# Patient Record
Sex: FEMALE | Race: WHITE | NOT HISPANIC OR LATINO | Employment: STUDENT | URBAN - METROPOLITAN AREA
[De-identification: names, ages, dates, MRNs, and addresses within clinical notes are randomized per-mention and may not be internally consistent; named-entity substitution may affect disease eponyms.]

---
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
## 2022-01-12 ENCOUNTER — OFFICE VISIT (OUTPATIENT)
Dept: BEHAVIORAL HEALTH | Facility: CLINIC | Age: 22
End: 2022-01-12

## 2022-01-12 VITALS
DIASTOLIC BLOOD PRESSURE: 70 MMHG | SYSTOLIC BLOOD PRESSURE: 112 MMHG | WEIGHT: 142 LBS | BODY MASS INDEX: 24.24 KG/M2 | HEIGHT: 64 IN

## 2022-01-12 DIAGNOSIS — F32.9 REACTIVE DEPRESSION: ICD-10-CM

## 2022-01-12 DIAGNOSIS — F41.1 GAD (GENERALIZED ANXIETY DISORDER): ICD-10-CM

## 2022-01-12 DIAGNOSIS — F43.21 GRIEF: Primary | ICD-10-CM

## 2022-01-12 DIAGNOSIS — F90.0 ATTENTION DEFICIT HYPERACTIVITY DISORDER (ADHD), PREDOMINANTLY INATTENTIVE TYPE: ICD-10-CM

## 2022-01-12 PROCEDURE — 90792 PSYCH DIAG EVAL W/MED SRVCS: CPT | Performed by: NURSE PRACTITIONER

## 2022-01-12 RX ORDER — DESVENLAFAXINE SUCCINATE 50 MG/1
50 TABLET, EXTENDED RELEASE ORAL DAILY
COMMUNITY
Start: 2021-12-01 | End: 2022-01-12 | Stop reason: SDUPTHER

## 2022-01-12 RX ORDER — DESVENLAFAXINE SUCCINATE 50 MG/1
50 TABLET, EXTENDED RELEASE ORAL DAILY
Qty: 30 TABLET | Refills: 3 | Status: SHIPPED | OUTPATIENT
Start: 2022-01-12 | End: 2022-03-09

## 2022-01-12 NOTE — PROGRESS NOTES
Patient Name: Maru Caraballo  MRN: [id number]   :  2000     Referring Physician: Jonh Carbajal MD    Chief Complaint:     ICD-10-CM ICD-9-CM   1. Grief  F43.21 309.0   2. Reactive depression  F32.9 300.4   3. RAMSEY (generalized anxiety disorder)  F41.1 300.02   4. Attention deficit hyperactivity disorder (ADHD), predominantly inattentive type  F90.0 314.00       HPI:   Maru Caraballo is a 21 y.o. female who is here today for initial evaluation of ADHD, Anxiety , Depression and Grief. Pt states her parents think pts anxiety is through the roof. Worries a lot about death. Wears a pulse ox most of the day. Figits. Counts a lot. Feels pristiq is starting to help. Focus is terrible. Is in class trying to listen but is thinking about other things and drifts off. When she is at school she almost never studies. When she is at home her parents make her study so she is able to get more done at home. In school for business. In 2019 watched her aunt die in the hospital. She was the only one there.     Past Medical History:   History reviewed. No pertinent past medical history.    Past Surgical History:   Past Surgical History:   Procedure Laterality Date   • CHOLECYSTECTOMY     • WISDOM TOOTH EXTRACTION         Social History:   Social History     Socioeconomic History   • Marital status: Single   Tobacco Use   • Smoking status: Never Smoker   • Smokeless tobacco: Never Used   Vaping Use   • Vaping Use: Never used   Substance and Sexual Activity   • Alcohol use: Never   • Sexual activity: Never       Family History:  Family History   Problem Relation Age of Onset   • Diabetes Paternal Grandfather    • Lymphoma Paternal Grandmother    • Leukemia Maternal Grandmother    • Diabetes Maternal Grandmother    • Lupus Maternal Aunt    • Thyroid cancer Maternal Aunt        Allergy:  Allergies   Allergen Reactions   • Latex Rash       Current Medications:   Current Outpatient Medications   Medication Sig Dispense Refill    • desvenlafaxine (PRISTIQ) 50 MG 24 hr tablet Take 1 tablet by mouth Daily. 30 tablet 3   • norethindrone-ethinyl estradiol FE (Junel FE 1/20) 1-20 MG-MCG per tablet Take 1 tablet by mouth Daily. 28 tablet 12   • lisdexamfetamine (Vyvanse) 20 MG capsule Take 1 capsule by mouth Every Morning 30 capsule 0     No current facility-administered medications for this visit.       Lab Results:   Office Visit on 01/12/2022   Component Date Value Ref Range Status   • Report Summary 01/12/2022 FINAL   Final    Comment: ====================================================================  [facility] DRUG ANALYSIS,[facility]  ====================================================================  Test                             Result       Flag       Units    NO DRUGS DETECTED.  ====================================================================  Test                      Result    Flag   Units      Ref Range    Creatinine              141              mg/dL      >=20  ====================================================================  Declared Medications:   Medication list was not provided.  ====================================================================  For clinical consultation, please call [phone].  ====================================================================         Review of Symptoms:   Review of Systems   Constitutional: Negative for activity change, appetite change, fatigue, unexpected weight gain and unexpected weight loss.   Respiratory: Negative for shortness of breath and wheezing.    Gastrointestinal: Negative for constipation, diarrhea, nausea and vomiting.   Musculoskeletal: Negative for gait problem.   Skin: Negative for dry skin and rash.   Neurological: Negative for dizziness, speech difficulty, weakness, light-headedness, headache, memory problem and confusion.   Psychiatric/Behavioral: Positive for decreased concentration, depressed mood and stress. Negative for agitation, behavioral  "problems, dysphoric mood, hallucinations, self-injury, sleep disturbance, suicidal ideas and negative for hyperactivity. The patient is nervous/anxious.        Physical Exam:   Physical Exam  Vitals and nursing note reviewed.   Constitutional:       General: She is not in acute distress.     Appearance: She is well-developed. She is not diaphoretic.   HENT:      Head: Normocephalic and atraumatic.   Eyes:      Conjunctiva/sclera: Conjunctivae normal.   Cardiovascular:      Rate and Rhythm: Normal rate.   Pulmonary:      Effort: Pulmonary effort is normal. No respiratory distress.   Musculoskeletal:         General: Normal range of motion.      Cervical back: Full passive range of motion without pain and normal range of motion.   Skin:     General: Skin is warm and dry.   Neurological:      Mental Status: She is alert and oriented to person, place, and time.   Psychiatric:         Mood and Affect: Mood is anxious and depressed. Affect is not labile, blunt, angry or inappropriate.         Speech: Speech is not rapid and pressured or tangential.         Behavior: Behavior normal. Behavior is not agitated, slowed, aggressive, withdrawn, hyperactive or combative. Behavior is cooperative.         Thought Content: Thought content normal. Thought content is not paranoid or delusional. Thought content does not include homicidal or suicidal ideation. Thought content does not include homicidal or suicidal plan.         Judgment: Judgment normal.       Blood pressure 112/70, height 162.6 cm (64\"), weight 64.4 kg (142 lb).  Body mass index is 24.37 kg/m².     Mental Status Exam:   Appearance: appropriate  Hygiene:   good  Cooperation:  Cooperative  Eye Contact:  Good  Psychomotor Behavior:  Appropriate  Mood:anxious and depressed  Affect:  Appropriate  Hopelessness: Denies  Speech:  Normal  Thought Process:  Goal directed  Thought Content:  Normal  Suicidal:  None  Homicidal:  None  Hallucinations:  None  Delusion:  " None  Memory:  Intact  Orientation:  Person, Place, Time and Situation  Reliability:  good  Insight:  Good  Judgement:  Good  Impulse Control:  Good  Physical/Medical Issues:  No     PHQ-9 Depression Screening  Little interest or pleasure in doing things? 3   Feeling down, depressed, or hopeless? 1   Trouble falling or staying asleep, or sleeping too much? 3 (Trouble with all off and on)   Feeling tired or having little energy? 3   Poor appetite or overeating? 3 (Poor appetite)   Feeling bad about yourself - or that you are a failure or have let yourself or your family down? 1   Trouble concentrating on things, such as reading the newspaper or watching television? 3   Moving or speaking so slowly that other people could have noticed? Or the opposite - being so fidgety or restless that you have been moving around a lot more than usual? 0   Thoughts that you would be better off dead, or of hurting yourself in some way? 0   PHQ-9 Total Score 17   If you checked off any problems, how difficult have these problems made it for you to do your work, take care of things at home, or get along with other people? Extremely dIfficult      Assessment/Plan:   Diagnoses and all orders for this visit:    1. Grief (Primary)  -     Ambulatory Referral to Behavioral Health  -     Compliance Drug Analysis, Ur - Urine, Clean Catch    2. Reactive depression  -     Ambulatory Referral to Behavioral [facility]  -     desvenlafaxine (PRISTIQ) 50 MG 24 hr tablet; Take 1 tablet by mouth Daily.  Dispense: 30 tablet; Refill: 3  -     Compliance Drug Analysis, Ur - Urine, Clean Catch    3. RAMSEY (generalized anxiety disorder)  -     Ambulatory Referral to Behavioral Health  -     desvenlafaxine (PRISTIQ) 50 MG 24 hr tablet; Take 1 tablet by mouth Daily.  Dispense: 30 tablet; Refill: 3  -     Compliance Drug Analysis, Ur - Urine, Clean Catch    4. Attention deficit hyperactivity disorder (ADHD), predominantly inattentive type  -     Ambulatory Referral  to Behavioral Health  -     lisdexamfetamine (Vyvanse) 20 MG capsule; Take 1 capsule by mouth Every Morning  Dispense: 30 capsule; Refill: 0  -     Compliance Drug Analysis, Ur - Urine, Clean Catch    We will refer for therapy. Continue pristiq as pt states her ocd type symptoms are better, Will start Vyvanse 20 mg. UDS and CSA obtained.    A psychological evaluation was conducted in order to assess past and current level of functioning. Areas assessed included, but were not limited to: perception of social support, perception of ability to face and deal with challenges in life (positive functioning), anxiety symptoms, depressive symptoms, perspective on beliefs/belief system, coping skills for stress, intelligence level,  Therapeutic rapport was established. Interventions conducted today were geared towards incorporating medication management along with support for continued therapy. Education was also provided as to the med management with this provider and what to expect in subsequent sessions.    We discussed risks, benefits,goals and side effects of the above medication and the patient was agreeable with the plan.Patient was educated on the importance of compliance with treatment and follow-up appointments. Patient is aware to contact the [facility] with any worsening of symptoms. To call for questions or concerns and return early if necessary. Patent is agreeable to go to the Emergency Department or call 911 should they begin SI/HI.     Treatment Plan:   Discussed risks, benefits, and alternatives of medication. Encouraged healthy habits (eating, exercise and sleep). Call if any questions or problems arise. Medication reconciled. Controlled substance monitoring report reviewed. Provided psychoeducation.. Discussed coping strategies and current stressors. Set appropriate boundaries and limits for patient's well-being. Use distraction techniques to improve symptoms. Access support networks.      Return in  about 4 weeks (around 2/9/2022) for Follow Up 30 min.    Lisette Wyatt, APRN

## 2022-01-18 LAB — DRUGS UR: NORMAL

## 2022-02-08 ENCOUNTER — OFFICE VISIT (OUTPATIENT)
Dept: OBSTETRICS AND GYNECOLOGY | Facility: CLINIC | Age: 22
End: 2022-02-08

## 2022-02-08 VITALS
WEIGHT: 136 LBS | DIASTOLIC BLOOD PRESSURE: 80 MMHG | HEIGHT: 65 IN | BODY MASS INDEX: 22.66 KG/M2 | SYSTOLIC BLOOD PRESSURE: 118 MMHG

## 2022-02-08 DIAGNOSIS — N92.6 IRREGULAR PERIODS: Primary | ICD-10-CM

## 2022-02-08 PROCEDURE — 99212 OFFICE O/P EST SF 10 MIN: CPT | Performed by: OBSTETRICS & GYNECOLOGY

## 2022-02-08 RX ORDER — NORETHINDRONE ACETATE AND ETHINYL ESTRADIOL 1MG-20(21)
1 KIT ORAL DAILY
Qty: 28 TABLET | Refills: 12 | Status: SHIPPED | OUTPATIENT
Start: 2022-02-08 | End: 2023-03-20

## 2022-02-08 NOTE — PROGRESS NOTES
Chief Complaint   Patient presents with   • Contraception     refill         Subjective   HPI  Maru Caraballo is a 21 y.o. female, , LMP was on Patient's last menstrual period was 2022 (within days). who presents for survellience of birth control.    Her periods are regular every 28-30 days, lasting 4-5 days.  Dysmenorrhea:mild, occurring first 1-2 days of flow.  Partner Status: Marital Status: single.  The patient's current method of contraception is contraceptive methods: OCP (estrogen/progesterone).  She is satisfied with her current method of birth control. The patient reports additional symptoms as none.      Patient states that since she was seen last her periods have become regular occurring around the same time each month. She states her last period in January was a little heavier than she is used to and this lasted for 3 days. She denies soaking more than one pad or tampon an hour.    Partner Status: Marital Status: single.  New Partners since last visit: no.  Desires STD Screening: no.    Additional OB/GYN History   Last Pap :   Last Completed Pap Smear     This patient has no relevant Health Maintenance data.        History of abnormal Pap smear: no    OB History        0    Para   0    Term   0       0    AB   0    Living   0       SAB   0    IAB   0    Ectopic   0    Molar   0    Multiple   0    Live Births   0                The additional following portions of the patient's history were reviewed and updated as appropriate: allergies, current medications, past family history, past medical history, past social history and past surgical history.    Review of Systems   Constitutional: Negative.    HENT: Negative.    Eyes: Negative.    Respiratory: Negative.    Cardiovascular: Negative.    Gastrointestinal: Negative.    Endocrine: Negative.    Genitourinary: Negative.    Musculoskeletal: Negative.    Skin: Negative.    Allergic/Immunologic: Negative.    Neurological: Negative.   "  Hematological: Negative.    Psychiatric/Behavioral: Negative.        I have reviewed and agree with the HPI, ROS, and historical information as entered above. Alexandra العلي MD    Objective   /80   Ht 163.8 cm (64.5\")   Wt 61.7 kg (136 lb)   LMP 01/24/2022 (Within Days)   BMI 22.98 kg/m²     Physical Exam  Vitals and nursing note reviewed.   Constitutional:       Appearance: Normal appearance. She is well-developed.   HENT:      Head: Normocephalic and atraumatic.   Pulmonary:      Effort: Pulmonary effort is normal.      Breath sounds: Normal breath sounds.   Abdominal:      General: There is no distension.      Palpations: Abdomen is soft. Abdomen is not rigid. There is no mass.      Tenderness: There is no abdominal tenderness. There is no guarding or rebound.   Musculoskeletal:      Cervical back: Normal range of motion.   Skin:     General: Skin is warm and dry.   Neurological:      Mental Status: She is alert and oriented to person, place, and time.   Psychiatric:         Mood and Affect: Mood normal.         Behavior: Behavior normal.         Assessment/Plan     Assessment     Problem List Items Addressed This Visit        Genitourinary and Reproductive     Irregular periods - Primary    Overview     Patient started periods at age 13.  They became irregular at age 17..  Controlled on Junel         Relevant Medications    norethindrone-ethinyl estradiol FE (Junel FE 1/20) 1-20 MG-MCG per tablet          Counseling on alternative methods of birth control provided  Counseling on use of oral contraceptives provided  Counseling on use of condoms provided  Follow Up: No follow-ups on file.      Alexandra العلي MD  02/08/2022  "

## 2022-03-09 ENCOUNTER — OFFICE VISIT (OUTPATIENT)
Dept: BEHAVIORAL HEALTH | Facility: CLINIC | Age: 22
End: 2022-03-09

## 2022-03-09 VITALS
WEIGHT: 138.2 LBS | SYSTOLIC BLOOD PRESSURE: 130 MMHG | HEIGHT: 64 IN | DIASTOLIC BLOOD PRESSURE: 85 MMHG | BODY MASS INDEX: 23.6 KG/M2

## 2022-03-09 DIAGNOSIS — F41.1 GAD (GENERALIZED ANXIETY DISORDER): Primary | ICD-10-CM

## 2022-03-09 DIAGNOSIS — F90.0 ATTENTION DEFICIT HYPERACTIVITY DISORDER (ADHD), PREDOMINANTLY INATTENTIVE TYPE: ICD-10-CM

## 2022-03-09 DIAGNOSIS — F43.21 GRIEF: ICD-10-CM

## 2022-03-09 DIAGNOSIS — F32.9 REACTIVE DEPRESSION: ICD-10-CM

## 2022-03-09 PROCEDURE — 99213 OFFICE O/P EST LOW 20 MIN: CPT | Performed by: NURSE PRACTITIONER

## 2022-03-09 RX ORDER — DESVENLAFAXINE 100 MG/1
100 TABLET, EXTENDED RELEASE ORAL DAILY
Qty: 30 TABLET | Refills: 3 | Status: SHIPPED | OUTPATIENT
Start: 2022-03-09 | End: 2022-04-06

## 2022-03-09 RX ORDER — DEXMETHYLPHENIDATE HYDROCHLORIDE 10 MG/1
10 CAPSULE, EXTENDED RELEASE ORAL EVERY MORNING
Qty: 30 CAPSULE | Refills: 0 | Status: SHIPPED | OUTPATIENT
Start: 2022-03-09 | End: 2022-04-06

## 2022-03-09 NOTE — PROGRESS NOTES
Patient Name: Maru Caraballo  MRN: [id number]   :  2000     Chief Complaint:      ICD-10-CM ICD-9-CM   1. RAMSEY (generalized anxiety disorder)  F41.1 300.02   2. Attention deficit hyperactivity disorder (ADHD), predominantly inattentive type  F90.0 314.00   3. Reactive depression  F32.9 300.4   4. Grief  F43.21 309.0       History of Present Illness: Maru Caraballo is a 21 y.o. female is here today for medication management follow up.  Patient states she did not like Vyvanse and caused her significant weight loss.  Also states she is not seeing any improvement with the Pristiq.    The following portions of the patient's history were reviewed and updated as appropriate: allergies, current medications, past family history, past medical history, past social history, past surgical history and problem list.    Review of Systems;;  Review of Systems   Constitutional: Negative for activity change, appetite change, fatigue, unexpected weight gain and unexpected weight loss.   Respiratory: Negative for shortness of breath and wheezing.    Gastrointestinal: Negative for constipation, diarrhea, nausea and vomiting.   Musculoskeletal: Negative for gait problem.   Skin: Negative for dry skin and rash.   Neurological: Negative for dizziness, speech difficulty, weakness, light-headedness, headache, memory problem and confusion.   Psychiatric/Behavioral: Positive for decreased concentration and depressed mood. Negative for agitation, behavioral problems, dysphoric mood, hallucinations, self-injury, sleep disturbance, suicidal ideas, negative for hyperactivity and stress. The patient is nervous/anxious.        Physical Exam;;  Physical Exam  Vitals and nursing note reviewed.   Constitutional:       General: She is not in acute distress.     Appearance: She is well-developed. She is not diaphoretic.   HENT:      Head: Normocephalic and atraumatic.   Eyes:      Conjunctiva/sclera: Conjunctivae normal.   Cardiovascular:       "Rate and Rhythm: Normal rate.   Pulmonary:      Effort: Pulmonary effort is normal. No respiratory distress.   Musculoskeletal:         General: Normal range of motion.      Cervical back: Full passive range of motion without pain and normal range of motion.   Skin:     General: Skin is warm and dry.   Neurological:      Mental Status: She is alert and oriented to person, place, and time.   Psychiatric:         Mood and Affect: Mood is anxious and depressed. Affect is not labile, blunt, angry or inappropriate.         Speech: Speech is not rapid and pressured or tangential.         Behavior: Behavior normal. Behavior is not agitated, slowed, aggressive, withdrawn, hyperactive or combative. Behavior is cooperative.         Thought Content: Thought content normal. Thought content is not paranoid or delusional. Thought content does not include homicidal or suicidal ideation. Thought content does not include homicidal or suicidal plan.         Judgment: Judgment normal.       Blood pressure 130/85, height 162.6 cm (64.02\"), weight 62.7 kg (138 lb 3.2 oz).  Body mass index is 23.71 kg/m².    Current Medications;;    Current Outpatient Medications:   •  norethindrone-ethinyl estradiol FE (Junel FE 1/20) 1-20 MG-MCG per tablet, Take 1 tablet by mouth Daily., Disp: 28 tablet, Rfl: 12  •  desvenlafaxine (Pristiq) 100 MG 24 hr tablet, Take 1 tablet by mouth Daily., Disp: 30 tablet, Rfl: 3  •  dexmethylphenidate XR (FOCALIN XR) 10 MG 24 hr capsule, Take 1 capsule by mouth Every Morning, Disp: 30 capsule, Rfl: 0    Lab Results:   Office Visit on 01/12/2022   Component Date Value Ref Range Status   • Report Summary 01/12/2022 FINAL   Final    Comment: ====================================================================  [facility] COMP DRUG ANALYSIS,[facility]  ====================================================================  Test                             Result       Flag       Units    NO DRUGS " DETECTED.  ====================================================================  Test                      Result    Flag   Units      Ref Range    Creatinine              141              mg/dL      >=20  ====================================================================  Declared Medications:   Medication list was not provided.  ====================================================================  For clinical consultation, please call [phone].  ====================================================================         Mental Status Exam:   Hygiene:   good  Cooperation:  Cooperative  Eye Contact:  Good  Psychomotor Behavior:  Appropriate  Mood:anxious and depressed  Affect:  Appropriate  Hopelessness: Denies  Speech:  Normal  Thought Process:  Goal directed  Thought Content:  Normal  Suicidal:  None  Homicidal:  None  Hallucinations:  None  Delusion:  None  Memory:  Intact  Orientation:  Person, Place, Time and Situation  Reliability:  good  Insight:  Good  Judgement:  Good  Impulse Control:  Good  Physical/Medical Issues:  No     PHQ-9 Depression Screening  Little interest or pleasure in doing things? 2-->more than half the days   Feeling down, depressed, or hopeless? 0-->not at all   Trouble falling or staying asleep, or sleeping too much? 3-->nearly every day   Feeling tired or having little energy? 3-->nearly every day   Poor appetite or overeating? 3-->nearly every day   Feeling bad about yourself - or that you are a failure or have let yourself or your family down? 1-->several days   Trouble concentrating on things, such as reading the newspaper or watching television? 3-->nearly every day   Moving or speaking so slowly that other people could have noticed? Or the opposite - being so fidgety or restless that you have been moving around a lot more than usual? 3-->nearly every day   Thoughts that you would be better off dead, or of hurting yourself in some way? 0-->not at all   PHQ-9 Total Score 18    If you checked off any problems, how difficult have these problems made it for you to do your work, take care of things at home, or get along with other people? extremely difficult        Assessment/Plan:  Diagnoses and all orders for this visit:    1. RAMSEY (generalized anxiety disorder) (Primary)    2. Attention deficit hyperactivity disorder (ADHD), predominantly inattentive type  -     dexmethylphenidate XR (FOCALIN XR) 10 MG 24 hr capsule; Take 1 capsule by mouth Every Morning  Dispense: 30 capsule; Refill: 0    3. Reactive depression    4. Grief    Other orders  -     desvenlafaxine (Pristiq) 100 MG 24 hr tablet; Take 1 tablet by mouth Daily.  Dispense: 30 tablet; Refill: 3      Vyvanse was not effective.  Caused significant weight loss.  We will switch to Focalin XR 10 mg every morning.  We will increase Pristiq to 100 mg daily.    A psychological evaluation was conducted in order to assess past and current level of functioning. Areas assessed included, but were not limited to: perception of social support, perception of ability to face and deal with challenges in life (positive functioning), anxiety symptoms, depressive symptoms, perspective on beliefs/belief system, coping skills for stress, intelligence level,  Therapeutic rapport was established. Interventions conducted today were geared towards incorporating medication management along with support for continued therapy. Education was also provided as to the med management with this provider and what to expect in subsequent sessions.    We discussed risks, benefits,goals and side effects of the above medication and the patient was agreeable with the plan.Patient was educated on the importance of compliance with treatment and follow-up appointments. Patient is aware to contact the [facility] with any worsening of symptoms. To call for questions or concerns and return early if necessary. Patent is agreeable to go to the Emergency Department or call 911  should they begin SI/HI.     Treatment Plan:   Discussed risks, benefits, and alternatives of medication. Encouraged healthy habits (eating, exercise and sleep). Call if any questions or problems arise. Medication reconciled. Controlled substance monitoring report reviewed. Provided psychoeducation.. Discussed coping strategies and current stressors. Set appropriate boundaries and limits for patient's well-being. Use distraction techniques to improve symptoms. Access support networks.      Return in about 4 weeks (around 4/6/2022) for Follow Up 15 min.    Lisette Wyatt, CHERELLE

## 2022-03-14 ENCOUNTER — PRIOR AUTHORIZATION (OUTPATIENT)
Dept: BEHAVIORAL HEALTH | Facility: CLINIC | Age: 22
End: 2022-03-14

## 2022-03-25 ENCOUNTER — TELEPHONE (OUTPATIENT)
Dept: OBSTETRICS AND GYNECOLOGY | Facility: CLINIC | Age: 22
End: 2022-03-25

## 2022-03-25 ENCOUNTER — TELEPHONE (OUTPATIENT)
Dept: FAMILY MEDICINE CLINIC | Facility: CLINIC | Age: 22
End: 2022-03-25

## 2022-03-25 NOTE — TELEPHONE ENCOUNTER
Pt reached out and stated she went to get her BC from the pharmacy and it was not available. She would like for someone to reach out and let her know it's been sent and completed

## 2022-03-25 NOTE — TELEPHONE ENCOUNTER
Rx was sent in Feb. She has not tried calling the pharmacy. Recommended she call them as it could be a new Rx number than the one she is trying to refill. She VU

## 2022-04-06 ENCOUNTER — OFFICE VISIT (OUTPATIENT)
Dept: BEHAVIORAL HEALTH | Facility: CLINIC | Age: 22
End: 2022-04-06

## 2022-04-06 VITALS
DIASTOLIC BLOOD PRESSURE: 80 MMHG | HEIGHT: 64 IN | WEIGHT: 140 LBS | BODY MASS INDEX: 23.9 KG/M2 | SYSTOLIC BLOOD PRESSURE: 120 MMHG

## 2022-04-06 DIAGNOSIS — F32.9 REACTIVE DEPRESSION: ICD-10-CM

## 2022-04-06 DIAGNOSIS — F41.1 GAD (GENERALIZED ANXIETY DISORDER): ICD-10-CM

## 2022-04-06 DIAGNOSIS — F43.21 GRIEF: ICD-10-CM

## 2022-04-06 DIAGNOSIS — F90.0 ATTENTION DEFICIT HYPERACTIVITY DISORDER (ADHD), PREDOMINANTLY INATTENTIVE TYPE: Primary | ICD-10-CM

## 2022-04-06 PROCEDURE — 99214 OFFICE O/P EST MOD 30 MIN: CPT | Performed by: NURSE PRACTITIONER

## 2022-04-06 RX ORDER — DESVENLAFAXINE 100 MG/1
100 TABLET, EXTENDED RELEASE ORAL DAILY
COMMUNITY
End: 2023-03-20

## 2022-04-06 RX ORDER — DEXTROAMPHETAMINE/AMPHETAMINE 10 MG
10 CAPSULE, EXT RELEASE 24 HR ORAL EVERY MORNING
Qty: 30 CAPSULE | Refills: 0 | Status: SHIPPED | OUTPATIENT
Start: 2022-04-06 | End: 2023-03-20

## 2022-04-06 NOTE — PROGRESS NOTES
Patient Name: Maru Caraballo  MRN: [id number]   :  2000     Chief Complaint:      ICD-10-CM ICD-9-CM   1. Attention deficit hyperactivity disorder (ADHD), predominantly inattentive type  F90.0 314.00   2. RAMSEY (generalized anxiety disorder)  F41.1 300.02   3. Reactive depression  F32.9 300.4   4. Grief  F43.21 309.0       History of Present Illness: Maru Caraballo is a 21 y.o. female is here today for medication management follow up.  Patient feels mood and anxiety are better.  Patient states she feels the Focalin made her more jittery and more irritable.  Would like to try something different.    The following portions of the patient's history were reviewed and updated as appropriate: allergies, current medications, past family history, past medical history, past social history, past surgical history and problem list.    Review of Systems;;  Review of Systems   Constitutional: Negative for activity change, appetite change, fatigue, unexpected weight gain and unexpected weight loss.   Respiratory: Negative for shortness of breath and wheezing.    Gastrointestinal: Negative for constipation, diarrhea, nausea and vomiting.   Musculoskeletal: Negative for gait problem.   Skin: Negative for dry skin and rash.   Neurological: Negative for dizziness, speech difficulty, weakness, light-headedness, headache, memory problem and confusion.   Psychiatric/Behavioral: Positive for decreased concentration. Negative for agitation, behavioral problems, dysphoric mood, hallucinations, self-injury, sleep disturbance, suicidal ideas, negative for hyperactivity, depressed mood and stress. The patient is not nervous/anxious.        Physical Exam;;  Physical Exam  Vitals and nursing note reviewed.   Constitutional:       General: She is not in acute distress.     Appearance: She is well-developed. She is not diaphoretic.   HENT:      Head: Normocephalic and atraumatic.   Eyes:      Conjunctiva/sclera: Conjunctivae normal.  "  Cardiovascular:      Rate and Rhythm: Normal rate.   Pulmonary:      Effort: Pulmonary effort is normal. No respiratory distress.   Musculoskeletal:         General: Normal range of motion.      Cervical back: Full passive range of motion without pain and normal range of motion.   Skin:     General: Skin is warm and dry.   Neurological:      Mental Status: She is alert and oriented to person, place, and time.   Psychiatric:         Mood and Affect: Mood is not anxious or depressed. Affect is not labile, blunt, angry or inappropriate.         Speech: Speech is not rapid and pressured or tangential.         Behavior: Behavior normal. Behavior is not agitated, slowed, aggressive, withdrawn, hyperactive or combative. Behavior is cooperative.         Thought Content: Thought content normal. Thought content is not paranoid or delusional. Thought content does not include homicidal or suicidal ideation. Thought content does not include homicidal or suicidal plan.         Judgment: Judgment normal.       Blood pressure 120/80, height 162.6 cm (64\"), weight 63.5 kg (140 lb).  Body mass index is 24.03 kg/m².    Current Medications;;    Current Outpatient Medications:   •  desvenlafaxine (PRISTIQ) 100 MG 24 hr tablet, Take 100 mg by mouth Daily., Disp: , Rfl:   •  norethindrone-ethinyl estradiol FE (Junel FE 1/20) 1-20 MG-MCG per tablet, Take 1 tablet by mouth Daily., Disp: 28 tablet, Rfl: 12  •  Adderall XR 10 MG 24 hr capsule, Take 1 capsule by mouth Every Morning, Disp: 30 capsule, Rfl: 0    Lab Results:   Office Visit on 01/12/2022   Component Date Value Ref Range Status   • Report Summary 01/12/2022 FINAL   Final    Comment: ====================================================================  [facility] COMP DRUG ANALYSIS,[facility]  ====================================================================  Test                             Result       Flag       Units    NO DRUGS " DETECTED.  ====================================================================  Test                      Result    Flag   Units      Ref Range    Creatinine              141              mg/dL      >=20  ====================================================================  Declared Medications:   Medication list was not provided.  ====================================================================  For clinical consultation, please call [phone].  ====================================================================         Mental Status Exam:   Hygiene:   good  Cooperation:  Cooperative  Eye Contact:  Good  Psychomotor Behavior:  Appropriate  Mood:within normal limits  Affect:  Appropriate  Hopelessness: Denies  Speech:  Normal  Thought Process:  Goal directed  Thought Content:  Normal  Suicidal:  None  Homicidal:  None  Hallucinations:  None  Delusion:  None  Memory:  Intact  Orientation:  Person, Place, Time and Situation  Reliability:  good  Insight:  Good  Judgement:  Good  Impulse Control:  Good  Physical/Medical Issues:  No     PHQ-9 Depression Screening  Little interest or pleasure in doing things? 2-->more than half the days   Feeling down, depressed, or hopeless? 0-->not at all   Trouble falling or staying asleep, or sleeping too much? 1-->several days   Feeling tired or having little energy? 2-->more than half the days   Poor appetite or overeating? 2-->more than half the days   Feeling bad about yourself - or that you are a failure or have let yourself or your family down? 1-->several days   Trouble concentrating on things, such as reading the newspaper or watching television? 3-->nearly every day   Moving or speaking so slowly that other people could have noticed? Or the opposite - being so fidgety or restless that you have been moving around a lot more than usual? 3-->nearly every day   Thoughts that you would be better off dead, or of hurting yourself in some way? 0-->not at all   PHQ-9 Total  Score 14   If you checked off any problems, how difficult have these problems made it for you to do your work, take care of things at home, or get along with other people? extremely difficult        Assessment/Plan:  Diagnoses and all orders for this visit:    1. Attention deficit hyperactivity disorder (ADHD), predominantly inattentive type (Primary)  -     Adderall XR 10 MG 24 hr capsule; Take 1 capsule by mouth Every Morning  Dispense: 30 capsule; Refill: 0    2. RAMSEY (generalized anxiety disorder)    3. Reactive depression    4. Grief      Continue Pristiq.  Discontinue Focalin.  Start Adderall XR 10 mg every morning.    A psychological evaluation was conducted in order to assess past and current level of functioning. Areas assessed included, but were not limited to: perception of social support, perception of ability to face and deal with challenges in life (positive functioning), anxiety symptoms, depressive symptoms, perspective on beliefs/belief system, coping skills for stress, intelligence level,  Therapeutic rapport was established. Interventions conducted today were geared towards incorporating medication management along with support for continued therapy. Education was also provided as to the med management with this provider and what to expect in subsequent sessions.    We discussed risks, benefits,goals and side effects of the above medication and the patient was agreeable with the plan.Patient was educated on the importance of compliance with treatment and follow-up appointments. Patient is aware to contact the [facility] with any worsening of symptoms. To call for questions or concerns and return early if necessary. Patent is agreeable to go to the Emergency Department or call 911 should they begin SI/HI.     Treatment Plan:   Discussed risks, benefits, and alternatives of medication. Encouraged healthy habits (eating, exercise and sleep). Call if any questions or problems arise. Medication  reconciled. Controlled substance monitoring report reviewed. Provided psychoeducation.. Discussed coping strategies and current stressors. Set appropriate boundaries and limits for patient's well-being. Use distraction techniques to improve symptoms. Access support networks.      Return in about 2 months (around 6/6/2022) for Follow Up 30 min.    Lisette Wyatt, APRN

## 2022-04-11 ENCOUNTER — PRIOR AUTHORIZATION (OUTPATIENT)
Dept: BEHAVIORAL HEALTH | Facility: CLINIC | Age: 22
End: 2022-04-11

## 2022-04-27 ENCOUNTER — OFFICE VISIT (OUTPATIENT)
Dept: BEHAVIORAL HEALTH | Facility: CLINIC | Age: 22
End: 2022-04-27

## 2022-04-27 VITALS — HEIGHT: 64 IN | WEIGHT: 149 LBS | BODY MASS INDEX: 25.44 KG/M2

## 2022-04-27 DIAGNOSIS — F43.21 GRIEF REACTION: ICD-10-CM

## 2022-04-27 DIAGNOSIS — F41.1 GENERALIZED ANXIETY DISORDER: Primary | ICD-10-CM

## 2022-04-27 PROCEDURE — 90791 PSYCH DIAGNOSTIC EVALUATION: CPT | Performed by: COUNSELOR

## 2022-05-02 NOTE — PROGRESS NOTES
Initial Therapy Office Visit      Date: 2022     Patient Name: Maru Caraballo  : 2000   Time In: 850  Time Out: 940    PCP: Jonh Carbajal MD    Chief Complaint:     ICD-10-CM ICD-9-CM   1. Generalized anxiety disorder  F41.1 300.02   2. Grief reaction  F43.21 309.0       History of Present Illness: Maru Caraballo is a 21 y.o. female who presents today for initial therapy session. Patient arrived for session on time, clean and casually dressed without evidence of intoxication, withdrawal, or perceptual disturbance. Patient was cooperative and agreeable to treatment and interacted with therapist.  The patient was seen at the [place] office location.  The patient comes to our office today because she is concerned about her anxiety.  She feels her anxiety is so bad at times that she does not want to meet any new people or go anywhere.  Crowded places really scared her, and she often makes up scenarios in her head about what could happen.  When the patient has anxiety, she states her chest hurts that it feels like she is going to have a heart attack, she states she takes, she stutters, she gets really nervous, she worries a lot over little things, and her biggest fear is death.  The patient states that she is a very emotional person, and she cries a lot.  The patient states that she does not sleep very well and often has bad dreams.  The patient discussed that she often times gets fixated on something and when she is fixed on something, it causes a lot of anxiety.  The patient's goal in life is to function.  Subjective      Review of Systems:   The following portions of the patient's history were reviewed and updated as appropriate: allergies, current medications, past family history, past medical history, past social history, past surgical history and problem list.    Past Medical History: No past medical history on file.    Past Surgical History:   Past Surgical History:   Procedure  Laterality Date   • CHOLECYSTECTOMY     • WISDOM TOOTH EXTRACTION         Family History:   Family History   Problem Relation Age of Onset   • Diabetes Paternal Grandfather    • Lymphoma Paternal Grandmother    • Leukemia Maternal Grandmother    • Diabetes Maternal Grandmother    • Lupus Maternal Aunt    • Thyroid cancer Maternal Aunt        Social History:   Social History     Socioeconomic History   • Marital status: Single   Tobacco Use   • Smoking status: Never Smoker   • Smokeless tobacco: Never Used   Vaping Use   • Vaping Use: Never used   Substance and Sexual Activity   • Alcohol use: Never   • Drug use: Never   • Sexual activity: Never       Trauma History: Yes    Spiritual: Unknown    Mental Illness and/or Substance Abuse: The patient has been diagnosed with anxiety.     History: No    Medication:     Current Outpatient Medications:   •  Adderall XR 10 MG 24 hr capsule, Take 1 capsule by mouth Every Morning, Disp: 30 capsule, Rfl: 0  •  desvenlafaxine (PRISTIQ) 100 MG 24 hr tablet, Take 100 mg by mouth Daily., Disp: , Rfl:   •  norethindrone-ethinyl estradiol FE (Junel FE 1/20) 1-20 MG-MCG per tablet, Take 1 tablet by mouth Daily., Disp: 28 tablet, Rfl: 12    Allergies:   Allergies   Allergen Reactions   • Latex Rash       Educational/Work History:  Highest level of education obtained: The patient graduated from [employer] with a degree in business.  Employment Status: unemployed    Significant Life Events:   Patient been through or witnessed a divorce? yes  The patient's parents  in 2012 when she was 14 or 15 years old.    Patient experienced a death / loss of relationship? yes  The patient's aunt passed in 2019 from lupus, and the patient's romel past December 31 of leukemia.    Patient experienced a major accident or tragic events? no  None    Patient experienced any other significant life events or trauma (such as verbal, physical, sexual abuse)? no  None    Legal History:  The patient has no  "significant history of legal issues.  Court-ordered: No    PHQ-9 Score:   PHQ-9 Total Score:       RAMSEY 7: Total Score: Unknown    Objective     Physical Exam:   Height 162.6 cm (64\"), weight 67.6 kg (149 lb). Body mass index is 25.58 kg/m².     Physical Exam    Patient's Support Network Includes:  extended family    Prognosis: Good with Ongoing Treatment     Mental Status Exam:   Hygiene:   good  Cooperation:  Cooperative  Eye Contact:  Good  Psychomotor Behavior:  Appropriate  Affect:  Appropriate  Mood: normal  Hopelessness: Denies  Speech:  Normal  Thought Process:  Goal directed  Thought Content:  Normal  Suicidal:  None  Homicidal:  None  Hallucinations:  None  Delusion:  None  Memory:  Intact  Orientation:  Person, Place, Time and Situation  Reliability:  good  Insight:  Good  Judgement:  Good  Impulse Control:  Good  Physical/Medical Issues:  No      Assessment / Plan      Assessment/Plan:   Diagnoses and all orders for this visit:    1. Generalized anxiety disorder (Primary)    2. Grief reaction         1. The therapist will continue to promote the therapeutic alliance, address the patient's issues and concerns, and strengthen her self-awareness, insights, and positive coping skills.  These positive coping skills include visualization, music, breathing, exercising, and positive self talk.    TREATMENT PLAN/GOALS: Continue supportive psychotherapy efforts and medications as indicated. Treatment and medication options discussed during today's visit. Patient ackowledged and verbally consented to continue with current treatment plan and was educated on the importance of compliance with treatment and follow-up appointments.     Counseled patient regarding multimodal approach with healthy nutrition, healthy sleep, regular physical activity, social activities, counseling, and medications.      Coping skills reviewed and encouraged positive framing of thoughts. No suicidal ideation or homicidal ideation at this " time.      Assisted patient in processing above session content; acknowledged and normalized patient’s thoughts, feelings, and concerns.  Applied  positive coping skills and behavior management in session.    Allowed patient to freely discuss issues without interruption or judgment. Provided safe, confidential environment to facilitate the development of positive therapeutic relationship and encourage open, honest communication. Assisted patient in identifying risk factors which would indicate the need for higher level of care including thoughts to harm self or others and/or self-harming behavior and encouraged patient to contact this office, call 911, or present to the nearest emergency room should any of these events occur. Discussed crisis intervention services and means to access.     Follow Up:   Return for Recheck.    ASAD Rivera  This document has been electronically signed by ASAD Rivera  May 2, 2022 17:10 EDT    Please note that portions of this note were completed with a voice recognition program. Efforts were made to edit dictation, but occasionally words are mistranscribed.

## 2022-05-25 ENCOUNTER — OFFICE VISIT (OUTPATIENT)
Dept: BEHAVIORAL HEALTH | Facility: CLINIC | Age: 22
End: 2022-05-25

## 2022-05-25 VITALS — WEIGHT: 155.4 LBS | BODY MASS INDEX: 26.53 KG/M2 | HEIGHT: 64 IN

## 2022-05-25 DIAGNOSIS — F41.1 GENERALIZED ANXIETY DISORDER: Primary | ICD-10-CM

## 2022-05-25 PROCEDURE — 90832 PSYTX W PT 30 MINUTES: CPT | Performed by: COUNSELOR

## 2022-05-25 RX ORDER — METHOCARBAMOL 500 MG/1
TABLET, FILM COATED ORAL
COMMUNITY
Start: 2022-04-20 | End: 2023-03-20

## 2022-05-25 RX ORDER — LINACLOTIDE 290 UG/1
CAPSULE, GELATIN COATED ORAL
COMMUNITY
Start: 2022-04-20 | End: 2023-03-20

## 2022-06-09 NOTE — PROGRESS NOTES
Follow Up Therapy Office Visit      Date: 2022     Patient Name: Maru Caraballo  : 2000   Time In: 5:15  Time Out: 5:46    PCP: Jonh Carbajal MD    Chief Complaint:     ICD-10-CM ICD-9-CM   1. Generalized anxiety disorder  F41.1 300.02       History of Present Illness: Maru Caraballo is a 21 y.o. female who presents today for follow up therapy session. Patient arrived for session on time, clean and casually dressed without evidence of intoxication, withdrawal, or perceptual disturbance. Patient was cooperative and agreeable to treatment and interacted with therapist.  The patient was seen at the [place] office location. The patient states that her anxiety has increased. The patient discussed that it can last awhile, and when it does, she has a hard time breathing, and her chest hurts. The patients mother had a hard time dealing with her, and she sent her to her fathers to give herself some time. The patient discussed that she needs to get a refill on her medication.        Subjective      Review of Systems:   The following portions of the patient's history were reviewed and updated as appropriate: allergies, current medications, past family history, past medical history, past social history, past surgical history and problem list.    Past Medical History: No past medical history on file.    Past Surgical History:   Past Surgical History:   Procedure Laterality Date   • CHOLECYSTECTOMY     • WISDOM TOOTH EXTRACTION         Family History:   Family History   Problem Relation Age of Onset   • Diabetes Paternal Grandfather    • Lymphoma Paternal Grandmother    • Leukemia Maternal Grandmother    • Diabetes Maternal Grandmother    • Lupus Maternal Aunt    • Thyroid cancer Maternal Aunt        Social History:   Social History     Socioeconomic History   • Marital status: Single   Tobacco Use   • Smoking status: Never Smoker   • Smokeless tobacco: Never Used   Vaping Use   • Vaping Use: Never  "used   Substance and Sexual Activity   • Alcohol use: Never   • Drug use: Never   • Sexual activity: Never       Trauma History: Yes    Spiritual: Unknown    Mental Illness and/or Substance Abuse: The patient has been diagnosed with anxiety.     History: No    Medication:     Current Outpatient Medications:   •  Linzess 290 MCG capsule capsule, TAKE 1 CAPSULE BY MOUTH EVERY DAY 30 MINUTES BEFORE FIRST MEAL OF THE DAY ON AN EMPTY STOMACH, Disp: , Rfl:   •  methocarbamol (ROBAXIN) 500 MG tablet, TAKE 1 TABLET BY MOUTH EVERY 8 HOURS FOR 7 DAYS, Disp: , Rfl:   •  norethindrone-ethinyl estradiol FE (Junel FE 1/20) 1-20 MG-MCG per tablet, Take 1 tablet by mouth Daily., Disp: 28 tablet, Rfl: 12  •  Adderall XR 10 MG 24 hr capsule, Take 1 capsule by mouth Every Morning, Disp: 30 capsule, Rfl: 0  •  desvenlafaxine (PRISTIQ) 100 MG 24 hr tablet, Take 100 mg by mouth Daily., Disp: , Rfl:     Allergies:   Allergies   Allergen Reactions   • Latex Rash       Educational/Work History:  Highest level of education obtained: The patient graduated from [employer] with a degree in business.  Employment Status: unemployed    Significant Life Events:   Patient been through or witnessed a divorce? yes  The patient's parents  in 2012 when she was 14 or 15 years old.    Patient experienced a death / loss of relationship? yes  The patient's aunt passed in 2019 from lupus, and the patient's romel past December 31 of leukemia.    Patient experienced a major accident or tragic events? no  None    Patient experienced any other significant life events or trauma (such as verbal, physical, sexual abuse)? no  None    Legal History:  The patient has no significant history of legal issues.  Court-ordered: No    PHQ-9 Score:   PHQ-9 Total Score:       RAMSEY 7: Total Score: Unknown    Objective     Physical Exam:   Height 162.6 cm (64\"), weight 70.5 kg (155 lb 6.4 oz). Body mass index is 26.67 kg/m².     Physical Exam    Patient's Support " Network Includes:  extended family    Prognosis: Good with Ongoing Treatment     Mental Status Exam:   Hygiene:   good  Cooperation:  Cooperative  Eye Contact:  Good  Psychomotor Behavior:  Appropriate  Affect:  Appropriate  Mood: normal  Hopelessness: Denies  Speech:  Normal  Thought Process:  Goal directed  Thought Content:  Normal  Suicidal:  None  Homicidal:  None  Hallucinations:  None  Delusion:  None  Memory:  Intact  Orientation:  Person, Place, Time and Situation  Reliability:  good  Insight:  Good  Judgement:  Good  Impulse Control:  Good  Physical/Medical Issues:  No      Assessment / Plan      Assessment/Plan:   Diagnoses and all orders for this visit:    1. Generalized anxiety disorder (Primary)         1. The therapist will continue to promote the therapeutic alliance, address the patient's issues and concerns, and strengthen her self-awareness, insights, and positive coping skills.  These positive coping skills include visualization, music, breathing, exercising, and positive self talk.    TREATMENT PLAN/GOALS: Continue supportive psychotherapy efforts and medications as indicated. Treatment and medication options discussed during today's visit. Patient ackowledged and verbally consented to continue with current treatment plan and was educated on the importance of compliance with treatment and follow-up appointments.     Counseled patient regarding multimodal approach with healthy nutrition, healthy sleep, regular physical activity, social activities, counseling, and medications.      Coping skills reviewed and encouraged positive framing of thoughts. No suicidal ideation or homicidal ideation at this time.      Assisted patient in processing above session content; acknowledged and normalized patient’s thoughts, feelings, and concerns.  Applied  positive coping skills and behavior management in session.    Allowed patient to freely discuss issues without interruption or judgment. Provided safe,  confidential environment to facilitate the development of positive therapeutic relationship and encourage open, honest communication. Assisted patient in identifying risk factors which would indicate the need for higher level of care including thoughts to harm self or others and/or self-harming behavior and encouraged patient to contact this office, call 911, or present to the nearest emergency room should any of these events occur. Discussed crisis intervention services and means to access.     Follow Up:   No follow-ups on file.    ASAD Rivera  This document has been electronically signed by ASAD Rivera  June 9, 2022 15:13 EDT    Please note that portions of this note were completed with a voice recognition program. Efforts were made to edit dictation, but occasionally words are mistranscribed.

## 2022-07-18 ENCOUNTER — TELEPHONE (OUTPATIENT)
Dept: FAMILY MEDICINE CLINIC | Facility: CLINIC | Age: 22
End: 2022-07-18

## 2022-07-18 NOTE — TELEPHONE ENCOUNTER
Per Clarice, she received a call from [facility] saying patient was admitted. Provider requested that appt be changed to cancellation instead of a no show.

## 2023-03-06 ENCOUNTER — TELEPHONE (OUTPATIENT)
Dept: OBSTETRICS AND GYNECOLOGY | Facility: CLINIC | Age: 23
End: 2023-03-06

## 2023-03-06 NOTE — TELEPHONE ENCOUNTER
Caller: Maru Caraballo    Relationship to patient: Self    Best call back number: [phone]    Patient is needing:  PATIENT CANCELED APPT TODAY. R/S FOR NEXT AVAILABLE.

## 2023-03-20 ENCOUNTER — OFFICE VISIT (OUTPATIENT)
Dept: OBSTETRICS AND GYNECOLOGY | Facility: CLINIC | Age: 23
End: 2023-03-20
Payer: COMMERCIAL

## 2023-03-20 VITALS
BODY MASS INDEX: 26.26 KG/M2 | DIASTOLIC BLOOD PRESSURE: 86 MMHG | WEIGHT: 153.8 LBS | HEIGHT: 64 IN | SYSTOLIC BLOOD PRESSURE: 120 MMHG

## 2023-03-20 DIAGNOSIS — Z01.419 WOMEN'S ANNUAL ROUTINE GYNECOLOGICAL EXAMINATION: Primary | ICD-10-CM

## 2023-03-20 DIAGNOSIS — N92.6 IRREGULAR PERIODS: ICD-10-CM

## 2023-03-20 PROCEDURE — 3008F BODY MASS INDEX DOCD: CPT

## 2023-03-20 PROCEDURE — 1159F MED LIST DOCD IN RCRD: CPT

## 2023-03-20 PROCEDURE — 99395 PREV VISIT EST AGE 18-39: CPT

## 2023-03-20 PROCEDURE — 1160F RVW MEDS BY RX/DR IN RCRD: CPT

## 2023-03-20 PROCEDURE — 2014F MENTAL STATUS ASSESS: CPT

## 2023-03-20 RX ORDER — LANSOPRAZOLE 30 MG/1
30 CAPSULE, DELAYED RELEASE ORAL DAILY
COMMUNITY
Start: 2023-01-18

## 2023-03-20 RX ORDER — NORETHINDRONE ACETATE AND ETHINYL ESTRADIOL 1MG-20(21)
1 KIT ORAL DAILY
Qty: 84 TABLET | Refills: 4 | Status: SHIPPED | OUTPATIENT
Start: 2023-03-20 | End: 2024-03-19

## 2023-03-20 RX ORDER — NAPROXEN 500 MG/1
TABLET ORAL
COMMUNITY
Start: 2023-02-17

## 2023-03-20 RX ORDER — POLYETHYLENE GLYCOL 3350 17 G/17G
POWDER, FOR SOLUTION ORAL
COMMUNITY
Start: 2023-01-18

## 2023-03-20 RX ORDER — SERTRALINE HYDROCHLORIDE 25 MG/1
1 TABLET, FILM COATED ORAL DAILY
COMMUNITY
Start: 2023-02-23

## 2023-03-20 NOTE — PROGRESS NOTES
"        Chief Complaint   Patient presents with   • Gynecologic Exam     Annual            Subjective   HPI  Maru Caraballo is a 22 y.o. female, , Patient's last menstrual period was 2023. who presents for routine follow up on her birth control refill. She is currently using birth control for irregular periods.    With her birth control her periods are regular every 28-30 days, lasting 5 days. Dysmenorrhea:mild, occurring first 1-2 days of flow.  Partner Status: Marital Status: single.  She was taking OCP, but ran out a few weeks ago. She wants to discuss other birth control options.  The patient reports additional symptoms as none.      She has never been sexually active. STD testing recommendations have been explained to the patient and she does not desire STD testing.    Additional OB/GYN History     OB History        0    Para   0    Term   0       0    AB   0    Living   0       SAB   0    IAB   0    Ectopic   0    Molar   0    Multiple   0    Live Births   0                The additional following portions of the patient's history were reviewed and updated as appropriate: allergies, current medications, past family history, past medical history, past social history, past surgical history and problem list.    Review of Systems   Respiratory: Negative.  Negative for shortness of breath.    Cardiovascular: Negative.  Negative for chest pain.   Gastrointestinal: Positive for constipation and GERD. Negative for abdominal distention and abdominal pain.   Genitourinary: Negative.  Negative for breast discharge, breast lump, breast pain, dysuria, pelvic pain, pelvic pressure, vaginal bleeding, vaginal discharge and vaginal pain.   Skin: Negative.    Psychiatric/Behavioral: Negative.        I have reviewed and agree with the HPI, ROS, and historical information as entered above. Hui Valencia, APRN    Objective   /86   Ht 162.6 cm (64\")   Wt 69.8 kg (153 lb 12.8 oz)   LMP " 02/25/2023   BMI 26.40 kg/m²     Physical Exam  Vitals and nursing note reviewed.   Constitutional:       Appearance: Normal appearance. She is not ill-appearing.   Cardiovascular:      Rate and Rhythm: Normal rate and regular rhythm.      Heart sounds: Normal heart sounds.   Pulmonary:      Effort: Pulmonary effort is normal.      Breath sounds: Normal breath sounds.   Abdominal:      Palpations: Abdomen is soft. There is no mass.      Tenderness: There is abdominal tenderness (Mild per pt). There is no guarding or rebound.      Hernia: No hernia is present.   Neurological:      Mental Status: She is alert and oriented to person, place, and time.   Psychiatric:         Mood and Affect: Mood normal.         Behavior: Behavior normal.         Assessment & Plan     Assessment     Problem List Items Addressed This Visit        Genitourinary and Reproductive     Irregular periods    Overview     Patient started periods at age 13.  They became irregular at age 17..  Controlled on Junel         Relevant Medications    norethindrone-ethinyl estradiol FE (Junel FE 1/20) 1-20 MG-MCG per tablet   Other Visit Diagnoses     Women's annual routine gynecological examination    -  Primary          1. Counseling on alternative methods of birth control provided. After reviewing alternative methods patient would like to continue using Junel for irregular cycles. Patient denies contraindications for estrogen containing birth control.   2. Counseling on use of oral contraceptives provided. Birth control information sheet given to patient.   3. We reviewed s/sx r/t breast cancer. She has been advised to call if any concerning symptoms present.   4. Diastolic BP mildly elevated. R/t anxiety per pt. No hx of HTN.  5. Anxiety controlled with Zoloft.   6. Patient reports going weeks without having BMs. She reports hx of cholecytectomy. She states she is currently seeing a provider for stomach issues. They recommended she begin taking  Miralax. She reports having procedures scheduled for further workup as well. We discussed other interventions for constipation.  7. Reviewed appropriate exercise, healthy eating habits, importance of self breast exam and breast health, importance of medication compliance , birth control counseling including side effects and safe sex.   8. We reviewed pap smear and the recommendation to begin at age 21. Patient informed she will begin pap smears at next annual visit.     Plan:  1. Continue Miralax for constipation. Begin drinking plenty of water, increasing fiber in diet, start using a stool softener (Docusate 100 mg 1-2 times a day). F/U with PCP for further workup for GI issues.   2. Call for any questions or concerns.   3. Continue Zoloft for anxiety.   4. First pap smear 2024  5. Begin Junel daily as directed  6. Return in about 1 year (around 3/20/2024) for Annual physical or sooner if needed .         Hui Valencia, APRN  03/20/2023

## 2023-07-25 ENCOUNTER — OFFICE VISIT (OUTPATIENT)
Dept: OBSTETRICS AND GYNECOLOGY | Facility: CLINIC | Age: 23
End: 2023-07-25
Payer: COMMERCIAL

## 2023-07-25 VITALS
DIASTOLIC BLOOD PRESSURE: 78 MMHG | BODY MASS INDEX: 26.53 KG/M2 | SYSTOLIC BLOOD PRESSURE: 100 MMHG | WEIGHT: 155.4 LBS | HEIGHT: 64 IN

## 2023-07-25 DIAGNOSIS — R10.2 VAGINAL PAIN: Primary | ICD-10-CM

## 2023-07-25 DIAGNOSIS — R35.0 URINARY FREQUENCY: ICD-10-CM

## 2023-07-25 LAB
B-HCG UR QL: NEGATIVE
BILIRUB BLD-MCNC: NEGATIVE MG/DL
CLARITY, POC: CLEAR
COLOR UR: YELLOW
EXPIRATION DATE: NORMAL
GLUCOSE UR STRIP-MCNC: NEGATIVE MG/DL
INTERNAL NEGATIVE CONTROL: NORMAL
INTERNAL POSITIVE CONTROL: NORMAL
KETONES UR QL: NEGATIVE
LEUKOCYTE EST, POC: NEGATIVE
Lab: NORMAL
NITRITE UR-MCNC: NEGATIVE MG/ML
PH UR: 6 [PH] (ref 5–8)
PROT UR STRIP-MCNC: NEGATIVE MG/DL
RBC # UR STRIP: ABNORMAL /UL
SP GR UR: 1.01 (ref 1–1.03)
UROBILINOGEN UR QL: ABNORMAL

## 2023-07-25 PROCEDURE — 1159F MED LIST DOCD IN RCRD: CPT

## 2023-07-25 PROCEDURE — 99213 OFFICE O/P EST LOW 20 MIN: CPT

## 2023-07-25 PROCEDURE — 81025 URINE PREGNANCY TEST: CPT

## 2023-07-25 PROCEDURE — 1160F RVW MEDS BY RX/DR IN RCRD: CPT

## 2023-07-25 RX ORDER — HYDROXYZINE PAMOATE 25 MG/1
1 CAPSULE ORAL 3 TIMES DAILY
COMMUNITY
Start: 2023-06-18

## 2023-07-25 RX ORDER — NORETHINDRONE ACETATE AND ETHINYL ESTRADIOL 1MG-20(24)
1 KIT ORAL DAILY
COMMUNITY

## 2023-07-25 NOTE — PROGRESS NOTES
"            Chief Complaint   Patient presents with    Gynecologic Exam       Subjective   HPI  Maru Caraballo is a 23 y.o. female, . Her last LMP was Patient's last menstrual period was 2023.. Periods are every 3 months with birth control. She presents today with c/o vaginal pain after walking her dog downhill several weeks ago. She states she felt like \"something dropped\" in her vaginal area. Also c/o LLQ pain that she experienced yesterday. Patient denies any pain or discomfort today. Denies vaginal bleeding or cramping.  Patient states she has never been sexually active.           Additional OB/GYN History     Last Pap : Never  Last Completed Pap Smear       This patient has no relevant Health Maintenance data.            Last mammogram: N/A  Last Completed Mammogram       This patient has no relevant Health Maintenance data.            Tobacco Usage?:  No  OB History          0    Para   0    Term   0       0    AB   0    Living   0         SAB   0    IAB   0    Ectopic   0    Molar   0    Multiple   0    Live Births   0                  Current Outpatient Medications:     hydrOXYzine pamoate (VISTARIL) 25 MG capsule, Take 1 capsule by mouth 3 (Three) Times a Day., Disp: , Rfl:     lansoprazole (PREVACID) 30 MG capsule, Take 1 capsule by mouth Daily. before a meal, Disp: , Rfl:     norethindrone-ethinyl estradiol-ferrous fumarate (Blisovi 24 Fe) 1-20 MG-MCG(24) per tablet, Take 1 tablet by mouth Daily., Disp: , Rfl:     polyethylene glycol (MIRALAX) 17 GM/SCOOP powder, MIX 1 CAPFUL OF POWDER IN 8 OUNCES OF LIQUID AND DRINK ONCE DAILY, Disp: , Rfl:      History reviewed. No pertinent past medical history.     Past Surgical History:   Procedure Laterality Date    CHOLECYSTECTOMY      WISDOM TOOTH EXTRACTION         The additional following portions of the patient's history were reviewed and updated as appropriate: allergies, current medications, past family history, past medical " "history, past social history, past surgical history, and problem list.    Review of Systems   Genitourinary:  Positive for frequency, urgency and vaginal pain.   All other systems reviewed and are negative.    I have reviewed and agree with the HPI, ROS, and historical information as entered above. Dorie Tobias, APRN      Objective   /78   Ht 162.6 cm (64.02\")   Wt 70.5 kg (155 lb 6.4 oz)   LMP 05/23/2023 Comment: Periods are every 3 months on OCP's  BMI 26.66 kg/m²     Physical Exam  Vitals and nursing note reviewed. Exam conducted with a chaperone present.   Constitutional:       General: She is not in acute distress.     Appearance: Normal appearance. She is not ill-appearing, toxic-appearing or diaphoretic.   Cardiovascular:      Rate and Rhythm: Normal rate.   Pulmonary:      Effort: Pulmonary effort is normal. No respiratory distress.   Genitourinary:     General: Normal vulva.      Exam position: Lithotomy position.      Vagina: Normal.      Rectum: Normal.      Comments: Patient unable to tolerate entire pelvic exam with speculum. Nu swab collected unable to visualize cervix and unable to perform bimanual exam.  Neurological:      Mental Status: She is alert and oriented to person, place, and time.   Psychiatric:         Mood and Affect: Mood normal.         Behavior: Behavior normal.         Thought Content: Thought content normal.         Judgment: Judgment normal.       Assessment & Plan     Assessment     Problem List Items Addressed This Visit          Gastrointestinal Abdominal     Vaginal pain - Primary    Overview     Patient notes only 2 days prior to her menses.  She has never tried tampons.  On evaluation, she has a 1 fingerbreadth opening to the vagina with tight mediators.  We discussed trying lubricant and tampons in order to try to dilate the vaginal opening.  Will reevaluate when patient comes back in in 4 months.  This was explained and demonstrated to the patient.         " Relevant Orders    POC Urinalysis Dipstick (Completed)    Urine Culture - Urine, Urine, Clean Catch    NuSwab VG, Candida 6sp - Swab, Vagina    POC Pregnancy, Urine (Completed)     Other Visit Diagnoses       Urinary frequency        Relevant Orders    Urine Culture - Urine, Urine, Clean Catch    POC Pregnancy, Urine (Completed)            Plan       Urinalysis.  Urine culture and sensitivity.  Nu swab collected and sent to rule out infectious reasons for pain.  Urine pregnancy test negative.  If results are negative discussed possibly sending patient to urology for hematuria depending on culture results, mother agreed.       Dorie Tobias, APRN  07/25/2023

## 2023-07-26 LAB
BACTERIA UR CULT: NORMAL
BACTERIA UR CULT: NORMAL

## 2023-07-27 LAB
A VAGINAE DNA VAG QL NAA+PROBE: NORMAL SCORE
BVAB2 DNA VAG QL NAA+PROBE: NORMAL SCORE
C ALBICANS DNA VAG QL NAA+PROBE: NEGATIVE
C GLABRATA DNA VAG QL NAA+PROBE: NEGATIVE
C KRUSEI DNA VAG QL NAA+PROBE: NEGATIVE
C LUSITANIAE DNA VAG QL NAA+PROBE: NEGATIVE
CANDIDA DNA VAG QL NAA+PROBE: NEGATIVE
MEGA1 DNA VAG QL NAA+PROBE: NORMAL SCORE
T VAGINALIS DNA VAG QL NAA+PROBE: NEGATIVE

## 2023-09-27 DIAGNOSIS — N92.6 IRREGULAR PERIODS: ICD-10-CM

## 2023-09-27 RX ORDER — NORETHINDRONE ACETATE AND ETHINYL ESTRADIOL 1MG-20(21)
KIT ORAL
Qty: 28 TABLET | Refills: 12 | OUTPATIENT
Start: 2023-09-27

## 2023-11-07 ENCOUNTER — TELEPHONE (OUTPATIENT)
Dept: OBSTETRICS AND GYNECOLOGY | Facility: CLINIC | Age: 23
End: 2023-11-07

## 2023-11-07 RX ORDER — NORETHINDRONE ACETATE AND ETHINYL ESTRADIOL 1MG-20(24)
1 KIT ORAL DAILY
Qty: 28 TABLET | Refills: 5 | Status: SHIPPED | OUTPATIENT
Start: 2023-11-07

## 2023-11-07 NOTE — TELEPHONE ENCOUNTER
The patient is needing a refill on norethindrone-ethinyl estradiol-ferrous fumarate (Blisovi 24 Fe) 1-20 MG-MCG(24) per tablet n

## 2023-12-22 ENCOUNTER — TELEPHONE (OUTPATIENT)
Dept: OBSTETRICS AND GYNECOLOGY | Facility: CLINIC | Age: 23
End: 2023-12-22
Payer: COMMERCIAL

## 2023-12-22 NOTE — TELEPHONE ENCOUNTER
Per hub patient would like to discuss her current period she is passing thick tissue and this is not normal for her

## 2023-12-22 NOTE — TELEPHONE ENCOUNTER
Patient states that she started her period yesterday. Patient is currently taking OCPs and is not sexually active. Patient states that she woke up today and did not notice any blood on her pad. Patient states that after she wiped she notice a quarter-sized clot on her toilet paper. Informed patient that this is normal. Instructed patient to CB with saturating 1 pad in <1 hour for more than 1 consecutive hour, clots bigger than a golf ball, or severe dysmenorrhea. She v/u.

## 2024-04-17 ENCOUNTER — OFFICE VISIT (OUTPATIENT)
Dept: OBSTETRICS AND GYNECOLOGY | Facility: CLINIC | Age: 24
End: 2024-04-17
Payer: COMMERCIAL

## 2024-04-17 VITALS
DIASTOLIC BLOOD PRESSURE: 80 MMHG | WEIGHT: 163.8 LBS | BODY MASS INDEX: 27.96 KG/M2 | SYSTOLIC BLOOD PRESSURE: 122 MMHG | HEIGHT: 64 IN

## 2024-04-17 DIAGNOSIS — Z01.419 WOMEN'S ANNUAL ROUTINE GYNECOLOGICAL EXAMINATION: Primary | ICD-10-CM

## 2024-04-17 DIAGNOSIS — R30.0 DYSURIA: ICD-10-CM

## 2024-04-17 DIAGNOSIS — R35.0 URINARY FREQUENCY: ICD-10-CM

## 2024-04-17 DIAGNOSIS — N92.6 IRREGULAR PERIODS: ICD-10-CM

## 2024-04-17 DIAGNOSIS — Z12.4 SCREENING FOR CERVICAL CANCER: ICD-10-CM

## 2024-04-17 LAB
BILIRUB BLD-MCNC: NEGATIVE MG/DL
CLARITY, POC: CLEAR
COLOR UR: YELLOW
GLUCOSE UR STRIP-MCNC: NEGATIVE MG/DL
KETONES UR QL: NEGATIVE
LEUKOCYTE EST, POC: NEGATIVE
NITRITE UR-MCNC: NEGATIVE MG/ML
PH UR: 6 [PH] (ref 5–8)
PROT UR STRIP-MCNC: NEGATIVE MG/DL
RBC # UR STRIP: NEGATIVE /UL
SP GR UR: 1.02 (ref 1–1.03)
UROBILINOGEN UR QL: NORMAL

## 2024-04-17 RX ORDER — METHYLPREDNISOLONE 4 MG/1
TABLET ORAL DAILY
COMMUNITY
Start: 2024-02-21 | End: 2024-04-17

## 2024-04-17 RX ORDER — NORETHINDRONE ACETATE AND ETHINYL ESTRADIOL 1MG-20(24)
1 KIT ORAL DAILY
Qty: 28 TABLET | Refills: 11 | Status: SHIPPED | OUTPATIENT
Start: 2024-04-17

## 2024-04-17 NOTE — PROGRESS NOTES
Gynecologic Annual Exam Note        Gynecologic Exam        Subjective     HPI  Maru Caraballo is a 23 y.o.  female who presents for annual well woman exam as a established patient. There were no changes to her medical or surgical history since her last visit.. LMP 2024. (Menstrual status: Oral contraceptives).  Pt states her cycle is regular, but sometimes has two periods in one cycle when she misses a pill. Will only have 1 cycle if takes pills as directed.  The flow is moderate. She reports dysmenorrhea is moderate occurring first 1-2 days of flow. Marital Status: single.  She has never been sexually active. STD testing recommendations have been explained to the patient and she does not desire STD testing.    The patient would like to discuss the following complaints today: Urinary frequency and burning with urination for approx 2 weeks. Has not tried intervention for improvement. Admits to caffeine intake.     Additional OB/GYN History   contraceptive methods: OCP (estrogen/progesterone)  Desires to: continue contraception  Thromboembolic Disease: none  History of migraines: no    History of STD: no    Last Pap : never.   Last Completed Pap Smear       This patient has no relevant Health Maintenance data.             History of abnormal Pap smear: no  Gardasil status:completed  Family history of uterine, colon, breast, or ovarian cancer: yes - breast-great maternal grandmother; uterine- maternal aunt  Performs monthly Self-Breast Exam: no  Exercises Regularly:no  Feelings of Anxiety or Depression: yes - well controlled with Zoloft  Tobacco Usage?: No       Current Outpatient Medications:     norethindrone-ethinyl estradiol-ferrous fumarate (Blisovi 24 Fe) 1-20 MG-MCG(24) per tablet, Take 1 tablet by mouth Daily., Disp: 28 tablet, Rfl: 11    sertraline (ZOLOFT) 50 MG tablet, Take 1 tablet by mouth Daily., Disp: , Rfl:      Patient is requesting refills of OCP.    OB History          0     "Para   0    Term   0       0    AB   0    Living   0         SAB   0    IAB   0    Ectopic   0    Molar   0    Multiple   0    Live Births   0                Health Maintenance   Topic Date Due    BMI FOLLOWUP  Never done    HPV VACCINES (1 - 3-dose series) Never done    HEPATITIS C SCREENING  Never done    ANNUAL PHYSICAL  Never done    PAP SMEAR  Never done    COVID-19 Vaccine ( - - season) Never done    TDAP/TD VACCINES (2 - Td or Tdap) 2024    Annual Gynecologic Pelvic and Breast Exam  2024    INFLUENZA VACCINE  2024    MENINGOCOCCAL VACCINE  Completed    Pneumococcal Vaccine 0-64  Aged Out       History reviewed. No pertinent past medical history.     Past Surgical History:   Procedure Laterality Date    CHOLECYSTECTOMY      WISDOM TOOTH EXTRACTION         The additional following portions of the patient's history were reviewed and updated as appropriate: allergies, current medications, past family history, past medical history, past social history, past surgical history, and problem list.    Review of Systems   Respiratory: Negative.  Negative for shortness of breath.    Cardiovascular: Negative.  Negative for chest pain.   Gastrointestinal: Negative.  Negative for abdominal distention, abdominal pain and constipation.   Genitourinary:  Positive for dysuria and frequency. Negative for breast discharge, breast lump, breast pain, dyspareunia, menstrual problem, pelvic pain, pelvic pressure, urgency, urinary incontinence, vaginal bleeding, vaginal discharge and vaginal pain.   Psychiatric/Behavioral:  Positive for depressed mood. Negative for suicidal ideas. The patient is nervous/anxious.         Managed with medication.         I have reviewed and agree with the HPI, ROS, and historical information as entered above. Hui Valencia, APRN          Objective   /80   Ht 162.6 cm (64.02\")   Wt 74.3 kg (163 lb 12.8 oz)   LMP 2024 (Exact Date)   BMI 28.10 kg/m² "     Physical Exam  Vitals and nursing note reviewed. Exam conducted with a chaperone present.   Constitutional:       Appearance: Normal appearance. She is well-developed. She is not ill-appearing.   HENT:      Head: Normocephalic and atraumatic.   Neck:      Thyroid: No thyroid mass, thyromegaly or thyroid tenderness.   Cardiovascular:      Rate and Rhythm: Normal rate and regular rhythm.      Heart sounds: Normal heart sounds. No murmur heard.  Pulmonary:      Effort: Pulmonary effort is normal. No retractions.      Breath sounds: Normal breath sounds. No wheezing, rhonchi or rales.   Chest:      Chest wall: No mass or tenderness.   Breasts:     Breasts are symmetrical.      Right: Normal. No mass, nipple discharge, skin change or tenderness.      Left: Normal. No mass, nipple discharge, skin change or tenderness.   Abdominal:      Palpations: Abdomen is soft. Abdomen is not rigid. There is no mass.      Tenderness: There is no abdominal tenderness. There is no guarding or rebound.      Hernia: No hernia is present. There is no hernia in the left inguinal area or right inguinal area.   Genitourinary:     General: Normal vulva.      Labia:         Right: No rash, tenderness or lesion.         Left: No rash, tenderness or lesion.       Vagina: Normal. No vaginal discharge, erythema, tenderness, bleeding or lesions.      Cervix: No cervical motion tenderness, discharge, friability, lesion, erythema or cervical bleeding.      Uterus: Normal. Not enlarged, not fixed and not tender.       Adnexa: Right adnexa normal and left adnexa normal.        Right: No mass or tenderness.          Left: No mass or tenderness.        Rectum: No external hemorrhoid.   Musculoskeletal:      Cervical back: Normal range of motion. No muscular tenderness.   Lymphadenopathy:      Upper Body:      Right upper body: No supraclavicular or axillary adenopathy.      Left upper body: No supraclavicular or axillary adenopathy.   Neurological:       Mental Status: She is alert and oriented to person, place, and time.   Psychiatric:         Mood and Affect: Mood normal.         Behavior: Behavior normal.            Assessment and Plan    Problem List Items Addressed This Visit       Irregular periods    Overview     Patient started periods at age 13.  They became irregular at age 17..  Controlled on Junel         Relevant Medications    norethindrone-ethinyl estradiol-ferrous fumarate (Blisovi 24 Fe) 1-20 MG-MCG(24) per tablet     Other Visit Diagnoses       Women's annual routine gynecological examination    -  Primary    Relevant Orders    LIQUID-BASED PAP SMEAR WITH HPV GENOTYPING IF ASCUS ([facility]IL,[facility]AD)    Dysuria        Relevant Orders    POC Urinalysis Dipstick    Urinary frequency        Relevant Orders    POC Urinalysis Dipstick    Screening for cervical cancer        Relevant Orders    LIQUID-BASED PAP SMEAR WITH HPV GENOTYPING IF ASCUS (SIL,COR,[facility])            GYN annual well woman exam.   Reviewed pap guidelines. Pap performed. No STI testing. Never been SA.   CCUA Negative. Advised to avoid bladder irritants: Alcohol, coffee and tea (caffeinated and decaffeinated), carbonated drinks  (cola, non-cola, diet, and caffeine-free), Acidic juices, Artificial sweeteners, Spicy Foods, Chocolate, etc. Increase H20 intake to 80oz/day. Follow up with PCP if symptoms persist or worsen.   Encouraged use of condoms for STD prevention.  She is happy on current OCP. She understands risks of blood clots, stroke, and theoretical risk of breast cancer.  Denies family history of blood clots. Rfs sent.   Advised to take OCP daily to prevent BTB.   Reviewed monthly self breast exams.  Instructed to call with lumps, pain, or breast discharge.    Reviewed exercise as a preventative health measures.   Reccommended Flu Vaccine in Fall of each year.  Blisovi education included in patient instructions.   Return in about 1 year (around 4/17/2025) for Annual physical or sooner if  needed.    Hui Valencia, APRN  04/17/2024          normal...

## 2024-04-19 LAB — REF LAB TEST METHOD: NORMAL

## 2024-04-25 DIAGNOSIS — N92.6 IRREGULAR PERIODS: ICD-10-CM

## 2024-04-25 RX ORDER — NORETHINDRONE ACETATE AND ETHINYL ESTRADIOL AND FERROUS FUMARATE 1MG-20(24)
1 KIT ORAL DAILY
Qty: 28 TABLET | Refills: 11 | OUTPATIENT
Start: 2024-04-25

## 2024-12-19 DIAGNOSIS — N92.6 IRREGULAR PERIODS: ICD-10-CM

## 2024-12-19 RX ORDER — NORETHINDRONE ACETATE AND ETHINYL ESTRADIOL AND FERROUS FUMARATE 1MG-20(24)
1 KIT ORAL DAILY
Qty: 28 TABLET | Refills: 2 | Status: SHIPPED | OUTPATIENT
Start: 2024-12-19